# Patient Record
Sex: MALE | Race: OTHER | URBAN - METROPOLITAN AREA
[De-identification: names, ages, dates, MRNs, and addresses within clinical notes are randomized per-mention and may not be internally consistent; named-entity substitution may affect disease eponyms.]

---

## 2024-09-26 NOTE — PROGRESS NOTES
Subjective:   Kelvin Ortiz is a 44 year old male who presents for Physical (Lab order,)   Patient is a pleasant 44-year-old male with a past medical history consistent for vitamin D insufficiency and HLD.  Patient presents to office today new to this provider new to this office to establish care and obtain physical.  Patient states his health is ok, he last went to doctor [facility] 4-5 years ago.     PMH: HLD was on statin in past, was working out more but not consistent.   PSH: Vasectomy 2014  Meds: used to take statin. Has been off 4-5 years.     Diet: Not great. Wants to cut back on portions and eat healthier. Wants to get down to 180 lbs  Exercise: He is running 3 miles 5 days per week.   Sleep: Sleep can be an issue, he is now working nights. He has been doing this since march. The plan is to get back to days.   Stress: Stressed about money and work. This is why he started night. Likes to run or go to the gym when stressed.   Social: , 4 kids, lives in [place], owns home.  Sexually Active: yes  Prophylaxis: vasectomy  Alcohol: none since 2006.  Tobacco: none since 2006 1 ppd prior for 10 years.   Drugs: Used to use cocaine, stopped 2002.  Work: [employer]ing [employer]  Vaccinations: UTD.   PHQ-9 score: 4            History reviewed. No pertinent past medical history.   History reviewed. No pertinent surgical history.     History/Other:    Chief Complaint Reviewed and Verified  Nursing Notes Reviewed and   Verified  Tobacco Reviewed  Allergies Reviewed  Medications Reviewed    Problem List Reviewed  Medical History Reviewed  Surgical History   Reviewed  Family History Reviewed  Social History Reviewed         Tobacco:  He has never smoked tobacco.    Current Outpatient Medications   Medication Sig Dispense Refill    clotrimazole 1 % External Cream Apply 1 Application topically 2 (two) times daily for 14 days. 60 g 0         Review of Systems:  Review of Systems   Constitutional: Negative.   Negative for activity change, chills and fever.   HENT: Negative.  Negative for congestion, ear pain, postnasal drip, sinus pain, sore throat and trouble swallowing.    Respiratory: Negative.  Negative for cough, shortness of breath and wheezing.    Cardiovascular: Negative.  Negative for chest pain and leg swelling.   Gastrointestinal: Negative.  Negative for abdominal pain, blood in stool, constipation, diarrhea, nausea and vomiting.   Endocrine: Negative.    Genitourinary: Negative.  Negative for difficulty urinating, dysuria and flank pain.   Musculoskeletal: Negative.  Negative for arthralgias, back pain and neck stiffness.   Skin: Negative.  Negative for color change and rash.   Neurological: Negative.  Negative for dizziness and headaches.   Hematological:  Negative for adenopathy.         Objective:   /86 (BP Location: Left arm, Patient Position: Sitting, Cuff Size: large)   Pulse 67   Ht 5' 5\" (1.651 m)   Wt 214 lb 12.8 oz (97.4 kg)   SpO2 98%   BMI 35.74 kg/m²  Estimated body mass index is 35.74 kg/m² as calculated from the following:    Height as of this encounter: 5' 5\" (1.651 m).    Weight as of this encounter: 214 lb 12.8 oz (97.4 kg).  Physical Exam  Vitals and nursing note reviewed.   Constitutional:       Appearance: Normal appearance. He is obese.   HENT:      Head: Normocephalic.      Right Ear: Tympanic membrane, ear canal and external ear normal. There is no impacted cerumen.      Left Ear: Tympanic membrane, ear canal and external ear normal. There is no impacted cerumen.      Nose: No congestion or rhinorrhea.      Comments: Atrophy tissues secondary to cocaine      Mouth/Throat:      Mouth: Mucous membranes are moist.   Eyes:      Extraocular Movements: Extraocular movements intact.      Pupils: Pupils are equal, round, and reactive to light.   Cardiovascular:      Rate and Rhythm: Normal rate and regular rhythm.      Pulses: Normal pulses.      Heart sounds: Normal heart sounds.  No murmur heard.  Pulmonary:      Effort: Pulmonary effort is normal. No respiratory distress.      Breath sounds: Normal breath sounds. No wheezing.   Abdominal:      General: There is no distension.      Palpations: Abdomen is soft.      Tenderness: There is no abdominal tenderness.   Musculoskeletal:         General: Normal range of motion.      Cervical back: Normal range of motion and neck supple.      Right lower leg: No edema.      Left lower leg: No edema.   Lymphadenopathy:      Cervical: No cervical adenopathy.   Skin:     General: Skin is warm and dry.      Capillary Refill: Capillary refill takes less than 2 seconds.      Findings: No rash.             Comments: Lipoma left upper chest size of quarter  Soft, freely movable, non tender.    Neurological:      General: No focal deficit present.      Mental Status: He is alert and oriented to person, place, and time.   Psychiatric:         Mood and Affect: Mood normal.         Behavior: Behavior normal.           Assessment & Plan:   1. Encounter for wellness examination in adult (Primary)  -     Hemoglobin A1C  -     CBC With Differential With Platelet  -     Comp Metabolic Panel (14)  -     Lipid Panel  -     TSH W Reflex To Free T4  -     Vitamin D, 25-Hydroxy  2. History of vasectomy  3. Vitamin D insufficiency  -     Vitamin D, 25-Hydroxy  4. Tinea pedis of both feet  -     Clotrimazole; Apply 1 Application topically 2 (two) times daily for 14 days.  Dispense: 60 g; Refill: 0  5. Lipoma of torso  6. Obesity, unspecified classification, unspecified obesity type, unspecified whether serious comorbidity present    1. Encounter for wellness examination in adult  Wellness labs ordered.  Encouraged increasing physical activity which includes raising heart rate 3 to 5 days/week for at least 30 minutes at a time, while also performing mild strength exercises.  Patient counseled on importance of dietary modifications, which may include limiting fats, red meat,  and carbohydrates, while increasing fruit and vegetable intake, and trying to adhere to a low sodium/Mediterranean diet.  Encouraged to manage stress.  Encouraged good sleep habits.  Encouraged good sexual health.    - Hemoglobin A1C  - CBC With Differential With Platelet  - Comp Metabolic Panel (14)  - Lipid Panel  - TSH W Reflex To Free T4  - Vitamin D, 25-Hydroxy    2. History of vasectomy  Vasectomy in 2014    3. Vitamin D insufficiency  Hx of vitamin D insuffiencey  Check vitamin D with wellness labs   - Vitamin D, 25-Hydroxy    4. Tinea pedis of both feet    -Daily cleansing of skin with a mild cleanser followed by drying of area completely  -Aeration of affected area when feasible  -Daily application of drying powders, such as powders composed of microporous cellulose  -Use of absorbent material or clothing, such as cotton or whitley wool, to separate skin in folds  -Weight loss in persons who are overweight or obese  -Use Topical cream as prescribed.     - clotrimazole 1 % External Cream; Apply 1 Application topically 2 (two) times daily for 14 days.  Dispense: 60 g; Refill: 0    5. Lipoma of torso  Does not require intervention per pt.  Just wanted to note    6. Obesity, unspecified classification, unspecified obesity type, unspecified whether serious comorbidity present  BMI in office 35.74  Check A1c, lipids, and cmp  Encouraged lifestyle modifications.     Patient aware of plan of care. All questions answered to satisfaction of the patient. Patient instructed to call office or reach out via [facility] if any issues arise. For urgent issues and/or reviewed red flags please proceed to the urgent care or ER.  Also, inform the nurse practitioner with any new symptoms or medication side effects.        Return if symptoms worsen or fail to improve.    Tomas Saez, EZEQUIEL, 9/26/2024, 12:43 PM

## 2024-09-27 ENCOUNTER — OFFICE VISIT (OUTPATIENT)
Dept: FAMILY MEDICINE CLINIC | Facility: CLINIC | Age: 44
End: 2024-09-27

## 2024-09-27 VITALS
OXYGEN SATURATION: 98 % | HEIGHT: 65 IN | DIASTOLIC BLOOD PRESSURE: 86 MMHG | WEIGHT: 214.81 LBS | HEART RATE: 67 BPM | BODY MASS INDEX: 35.79 KG/M2 | SYSTOLIC BLOOD PRESSURE: 130 MMHG

## 2024-09-27 DIAGNOSIS — E66.9 OBESITY, UNSPECIFIED CLASSIFICATION, UNSPECIFIED OBESITY TYPE, UNSPECIFIED WHETHER SERIOUS COMORBIDITY PRESENT: ICD-10-CM

## 2024-09-27 DIAGNOSIS — Z98.52 HISTORY OF VASECTOMY: ICD-10-CM

## 2024-09-27 DIAGNOSIS — R73.03 PREDIABETES: ICD-10-CM

## 2024-09-27 DIAGNOSIS — E78.5 HYPERLIPIDEMIA, UNSPECIFIED HYPERLIPIDEMIA TYPE: ICD-10-CM

## 2024-09-27 DIAGNOSIS — E55.9 VITAMIN D DEFICIENCY: ICD-10-CM

## 2024-09-27 DIAGNOSIS — B35.3 TINEA PEDIS OF BOTH FEET: ICD-10-CM

## 2024-09-27 DIAGNOSIS — D17.1 LIPOMA OF TORSO: ICD-10-CM

## 2024-09-27 DIAGNOSIS — Z00.00 ENCOUNTER FOR WELLNESS EXAMINATION IN ADULT: Primary | ICD-10-CM

## 2024-09-27 DIAGNOSIS — E55.9 VITAMIN D INSUFFICIENCY: ICD-10-CM

## 2024-09-27 PROCEDURE — 99386 PREV VISIT NEW AGE 40-64: CPT

## 2024-09-27 RX ORDER — CLOTRIMAZOLE 1 %
1 CREAM (GRAM) TOPICAL 2 TIMES DAILY
Qty: 60 G | Refills: 0 | Status: SHIPPED | OUTPATIENT
Start: 2024-09-27 | End: 2024-10-11

## 2024-09-30 ENCOUNTER — LAB ENCOUNTER (OUTPATIENT)
Dept: LAB | Age: 44
End: 2024-09-30
Payer: COMMERCIAL

## 2024-09-30 LAB
ALBUMIN SERPL-MCNC: 4.7 G/DL (ref 3.2–4.8)
ALBUMIN/GLOB SERPL: 1.8 {RATIO} (ref 1–2)
ALP LIVER SERPL-CCNC: 74 U/L
ALT SERPL-CCNC: 33 U/L
ANION GAP SERPL CALC-SCNC: 7 MMOL/L (ref 0–18)
AST SERPL-CCNC: 24 U/L (ref ?–34)
BASOPHILS # BLD AUTO: 0.02 X10(3) UL (ref 0–0.2)
BASOPHILS NFR BLD AUTO: 0.3 %
BILIRUB SERPL-MCNC: 0.8 MG/DL (ref 0.3–1.2)
BUN BLD-MCNC: 14 MG/DL (ref 9–23)
BUN/CREAT SERPL: 16.1 (ref 10–20)
CALCIUM BLD-MCNC: 9.4 MG/DL (ref 8.7–10.4)
CHLORIDE SERPL-SCNC: 104 MMOL/L (ref 98–112)
CHOLEST SERPL-MCNC: 235 MG/DL (ref ?–200)
CO2 SERPL-SCNC: 29 MMOL/L (ref 21–32)
CREAT BLD-MCNC: 0.87 MG/DL
DEPRECATED RDW RBC AUTO: 40.7 FL (ref 35.1–46.3)
EGFRCR SERPLBLD CKD-EPI 2021: 109 ML/MIN/1.73M2 (ref 60–?)
EOSINOPHIL # BLD AUTO: 0.12 X10(3) UL (ref 0–0.7)
EOSINOPHIL NFR BLD AUTO: 1.9 %
ERYTHROCYTE [DISTWIDTH] IN BLOOD BY AUTOMATED COUNT: 12.6 % (ref 11–15)
EST. AVERAGE GLUCOSE BLD GHB EST-MCNC: 126 MG/DL (ref 68–126)
FASTING PATIENT LIPID ANSWER: YES
FASTING STATUS PATIENT QL REPORTED: YES
GLOBULIN PLAS-MCNC: 2.6 G/DL (ref 2–3.5)
GLUCOSE BLD-MCNC: 93 MG/DL (ref 70–99)
HBA1C MFR BLD: 6 % (ref ?–5.7)
HCT VFR BLD AUTO: 44.5 %
HDLC SERPL-MCNC: 32 MG/DL (ref 40–59)
HGB BLD-MCNC: 15.4 G/DL
IMM GRANULOCYTES # BLD AUTO: 0.01 X10(3) UL (ref 0–1)
IMM GRANULOCYTES NFR BLD: 0.2 %
LDLC SERPL CALC-MCNC: 142 MG/DL (ref ?–100)
LYMPHOCYTES # BLD AUTO: 2.68 X10(3) UL (ref 1–4)
LYMPHOCYTES NFR BLD AUTO: 42.9 %
MCH RBC QN AUTO: 30.4 PG (ref 26–34)
MCHC RBC AUTO-ENTMCNC: 34.6 G/DL (ref 31–37)
MCV RBC AUTO: 87.9 FL
MONOCYTES # BLD AUTO: 0.48 X10(3) UL (ref 0.1–1)
MONOCYTES NFR BLD AUTO: 7.7 %
NEUTROPHILS # BLD AUTO: 2.94 X10 (3) UL (ref 1.5–7.7)
NEUTROPHILS # BLD AUTO: 2.94 X10(3) UL (ref 1.5–7.7)
NEUTROPHILS NFR BLD AUTO: 47 %
NONHDLC SERPL-MCNC: 203 MG/DL (ref ?–130)
OSMOLALITY SERPL CALC.SUM OF ELEC: 290 MOSM/KG (ref 275–295)
PLATELET # BLD AUTO: 316 10(3)UL (ref 150–450)
POTASSIUM SERPL-SCNC: 4.2 MMOL/L (ref 3.5–5.1)
PROT SERPL-MCNC: 7.3 G/DL (ref 5.7–8.2)
RBC # BLD AUTO: 5.06 X10(6)UL
SODIUM SERPL-SCNC: 140 MMOL/L (ref 136–145)
TRIGL SERPL-MCNC: 332 MG/DL (ref 30–149)
TSI SER-ACNC: 2.5 MIU/ML (ref 0.55–4.78)
VIT D+METAB SERPL-MCNC: 12.2 NG/ML (ref 30–100)
VLDLC SERPL CALC-MCNC: 63 MG/DL (ref 0–30)
WBC # BLD AUTO: 6.3 X10(3) UL (ref 4–11)

## 2024-09-30 PROCEDURE — 36415 COLL VENOUS BLD VENIPUNCTURE: CPT

## 2024-09-30 PROCEDURE — 84443 ASSAY THYROID STIM HORMONE: CPT

## 2024-09-30 PROCEDURE — 80053 COMPREHEN METABOLIC PANEL: CPT

## 2024-09-30 PROCEDURE — 80061 LIPID PANEL: CPT

## 2024-09-30 PROCEDURE — 83036 HEMOGLOBIN GLYCOSYLATED A1C: CPT

## 2024-09-30 PROCEDURE — 82306 VITAMIN D 25 HYDROXY: CPT

## 2024-09-30 PROCEDURE — 85025 COMPLETE CBC W/AUTO DIFF WBC: CPT

## 2024-09-30 RX ORDER — ERGOCALCIFEROL 1.25 MG/1
50000 CAPSULE, LIQUID FILLED ORAL WEEKLY
Qty: 12 CAPSULE | Refills: 0 | Status: SHIPPED | OUTPATIENT
Start: 2024-09-30 | End: 2024-12-17

## 2024-09-30 NOTE — PROGRESS NOTES
1. Prediabetes  Wellness labs with A1c 6.0.    New diagnosis prediabetes.  Patient counseled on diet, exercise, and lifestyle changes.    Will recheck A1c 1 year    2. Hyperlipidemia, unspecified hyperlipidemia type  The 10-year ASCVD risk score (Wendy PENA, et al., 2019) is: 4.4%    Values used to calculate the score:      Age: 44 years      Sex: Male      Is Non- : No      Diabetic: No      Tobacco smoker: No      Systolic Blood Pressure: 130 mmHg      Is BP treated: No      HDL Cholesterol: 32 mg/dL      Total Cholesterol: 235 mg/dL  Low ASCVD risk.  Counseled to increase fiber  Increase exercise and change diet  Consider adding omega-3 fish oil  Recheck cholesterol 1 year    3. Vitamin D deficiency  Patient withvitamin D insufficiency history  Vitamin D level added to wellness labs.  Vitamin D deficient at 12.2.  Start ergocalciferol once weekly x 12 weeks. Reassess need following. No refill needed.     - ergocalciferol 1.25 MG (11655 UT) Oral Cap; Take 1 capsule (50,000 Units total) by mouth once a week for 12 doses.  Dispense: 12 capsule; Refill: 0  - Vitamin D [E]; Future    EZEQUIEL De La O

## 2024-10-01 ENCOUNTER — TELEPHONE (OUTPATIENT)
Dept: FAMILY MEDICINE CLINIC | Facility: CLINIC | Age: 44
End: 2024-10-01

## 2024-10-01 NOTE — TELEPHONE ENCOUNTER
Spoke with patient (verified name and  ) , advised Tomas's note and no question at this time.   Patient is interested with opening [facility] account, two-factor verification done, would like TEXT for the informations .

## 2024-10-01 NOTE — TELEPHONE ENCOUNTER
Patient called (identified name and ),    Wanted to go over lab test results.  Gave results in detail.  He stated understanding.    2.  States he talked to Tomas Se NIEVES about his wife who had a vaginal infection.  He is wondering if he gave it to her or if it is being passed back and forth.  He said the infection was \"[place].\"  Tomas NIEVES, please advise?                 Bettina Edmond MA  2024  6:17 PM CDT Back to Top      Full Name & , verify w/ pt  Informed of below msg, pt understood and had no other questions    EZEQUIEL De La O  2024  5:42 PM CDT       Please call Czech-speaking patient without MyChart and let him know wellness labs have resulted. Your wellness labs have resulted.  Your complete blood count came back normal no issues with anemia or infection.  Your metabolic panel came back normal no issues with liver or kidneys.  Your thyroid test came back normal.  Your A1c or long-range sugar for diabetes came back slightly elevated at 6.0. This number indicates prediabetes. I would suggest making changes in diet, and increasing exercise. We would like to cut back on carbohydrates like pizza, pasta, tortillas, and bread. Eliminate simple sugars like candy, pop, or ice cream. Increase exercise to 3 to 5 times per week for at least 30 minutes. We will recheck your sugar in one year.  Your vitamin D level came back deficient at 12.2.  I have placed an order for ergocalciferol to be taken once weekly x 12 weeks.  Will reassess vitamin D level following.  Your cholesterol could use some work in the next year as well.  Your total cholesterol was 235, ideally like some below 200.  Your HDL or good cholesterol was 32 ideally like this number above 40.  Triglycerides are high at 332 and LDL is high at 142.  Would again suggest diet and exercise changes.  Try and increase healthy fats by eating pistachios, avocados, and cooking with extra virgin olive oil.  Also increasing  fiber.  Omega-3 fish oil over-the-counter can help with triglycerides as well.  Will recheck cholesterol in 1 year.  It was a pleasure meeting you! Keep up the great work!!     Best,     Tomas